# Patient Record
Sex: FEMALE | Race: WHITE | URBAN - METROPOLITAN AREA
[De-identification: names, ages, dates, MRNs, and addresses within clinical notes are randomized per-mention and may not be internally consistent; named-entity substitution may affect disease eponyms.]

---
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
## 2021-03-05 ENCOUNTER — APPOINTMENT (OUTPATIENT)
Dept: RADIOLOGY | Facility: MEDICAL CENTER | Age: 21
End: 2021-03-05
Attending: EMERGENCY MEDICINE
Payer: COMMERCIAL

## 2021-03-05 ENCOUNTER — HOSPITAL ENCOUNTER (EMERGENCY)
Facility: MEDICAL CENTER | Age: 21
End: 2021-03-05
Attending: EMERGENCY MEDICINE
Payer: COMMERCIAL

## 2021-03-05 VITALS
BODY MASS INDEX: 23.53 KG/M2 | HEIGHT: 67 IN | TEMPERATURE: 99.2 F | DIASTOLIC BLOOD PRESSURE: 80 MMHG | WEIGHT: 149.91 LBS | RESPIRATION RATE: 18 BRPM | OXYGEN SATURATION: 99 % | SYSTOLIC BLOOD PRESSURE: 126 MMHG | HEART RATE: 85 BPM

## 2021-03-05 DIAGNOSIS — S52.501A CLOSED FRACTURE OF DISTAL ENDS OF RIGHT RADIUS AND ULNA, INITIAL ENCOUNTER: ICD-10-CM
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
## 2021-03-05 DIAGNOSIS — S52.601A CLOSED FRACTURE OF DISTAL ENDS OF RIGHT RADIUS AND ULNA, INITIAL ENCOUNTER: ICD-10-CM

## 2021-03-05 PROCEDURE — 73090 X-RAY EXAM OF FOREARM: CPT | Mod: RT

## 2021-03-05 PROCEDURE — 302874 HCHG BANDAGE ACE 2 OR 3""

## 2021-03-05 PROCEDURE — 29125 APPL SHORT ARM SPLINT STATIC: CPT

## 2021-03-05 PROCEDURE — A9270 NON-COVERED ITEM OR SERVICE: HCPCS | Performed by: EMERGENCY MEDICINE

## 2021-03-05 PROCEDURE — 99284 EMERGENCY DEPT VISIT MOD MDM: CPT

## 2021-03-05 PROCEDURE — 700102 HCHG RX REV CODE 250 W/ 637 OVERRIDE(OP): Performed by: EMERGENCY MEDICINE

## 2021-03-05 PROCEDURE — 73200 CT UPPER EXTREMITY W/O DYE: CPT | Mod: RT

## 2021-03-05 PROCEDURE — 73130 X-RAY EXAM OF HAND: CPT | Mod: RT

## 2021-03-05 RX ORDER — HYDROCODONE BITARTRATE AND ACETAMINOPHEN 5; 325 MG/1; MG/1
1 TABLET ORAL EVERY 6 HOURS PRN
Qty: 10 TABLET | Refills: 0 | Status: CANCELLED | OUTPATIENT
Start: 2021-03-05 | End: 2021-03-08

## 2021-03-05 RX ORDER — HYDROCODONE BITARTRATE AND ACETAMINOPHEN 5; 325 MG/1; MG/1
1 TABLET ORAL EVERY 6 HOURS PRN
Qty: 10 TABLET | Refills: 0 | Status: SHIPPED | OUTPATIENT
Start: 2021-03-05 | End: 2021-03-05 | Stop reason: SDUPTHER

## 2021-03-05 RX ORDER — HYDROCODONE BITARTRATE AND ACETAMINOPHEN 5; 325 MG/1; MG/1
1 TABLET ORAL ONCE
Status: COMPLETED | OUTPATIENT
Start: 2021-03-05 | End: 2021-03-05

## 2021-03-05 RX ORDER — HYDROCODONE BITARTRATE AND ACETAMINOPHEN 5; 325 MG/1; MG/1
1 TABLET ORAL EVERY 6 HOURS PRN
Qty: 10 TABLET | Refills: 0 | Status: SHIPPED | OUTPATIENT
Start: 2021-03-05 | End: 2021-03-08

## 2021-03-05 RX ADMIN — HYDROCODONE BITARTRATE AND ACETAMINOPHEN 1 TABLET: 5; 325 TABLET ORAL at 19:10

## 2021-03-05 RX ADMIN — HYDROCODONE BITARTRATE AND ACETAMINOPHEN 1 TABLET: 5; 325 TABLET ORAL at 21:13

## 2021-03-05 ASSESSMENT — PAIN DESCRIPTION - DESCRIPTORS: DESCRIPTORS: ACHING

## 2021-03-06 NOTE — ED PROVIDER NOTES
"ED Provider Note    CHIEF COMPLAINT  Chief Complaint   Patient presents with   • Arm Injury   • Hand Injury   • Wrist Injury        HPI    Primary care provider: No primary care provider on file.   History obtained from: Patient, mother and sister  History limited by: None     Enedelia Rosenberg is a 20 y.o. female who presents to the ED with mother and sister complaining of right hand/wrist/distal forearm injury shortly prior to arrival while snowboarding.  Patient states that she fell on right outstretched hand and also hit her head but denies loss of consciousness.  She denies pain anywhere else except the right hand/wrist/forearm which was placed in a splint by .  She reports pain with movement.  She denies any sensory change.  She is right-hand dominant.  She denies possibility of pregnancy and is on birth control.  Mother reports patient without significant past medical problems.    REVIEW OF SYSTEMS  Please see HPI for pertinent positives/negatives.     PAST MEDICAL HISTORY  No past medical history on file.     SURGICAL HISTORY  History reviewed. No pertinent surgical history.     SOCIAL HISTORY  Social History     Tobacco Use   • Smoking status: Never Smoker   • Smokeless tobacco: Never Used   Substance and Sexual Activity   • Alcohol use: Yes     Comment: occasionally   • Drug use: Not on file   • Sexual activity: Not on file        FAMILY HISTORY  History reviewed. No pertinent family history.     CURRENT MEDICATIONS  Home Medications     Reviewed by Ella Robledo PharmD (Pharmacist) on 03/05/21 at 8159  Med List Status: Partial   Medication Last Dose Status        Patient Pancho Taking any Medications                        ALLERGIES  No Known Allergies     PHYSICAL EXAM  VITAL SIGNS: /80   Pulse 85   Temp 37.3 °C (99.2 °F) (Temporal)   Resp 18   Ht 1.702 m (5' 7\")   Wt 68 kg (149 lb 14.6 oz)   LMP  (Exact Date)   SpO2 99%   BMI 23.48 kg/m²  @DARLING[[id number]::@     Pulse ox " interpretation: 98% I interpret this pulse ox as normal     Constitutional: Well developed, well nourished, alert in mild discomfort, nontoxic appearance   HENT: No external signs of trauma, normocephalic   Eyes: No discharge, no icterus   Neck: No stridor   Cardiovascular: Strong distal pulses and good perfusion   Thorax & Lungs: No respiratory distress   Extremities: No cyanosis, swelling and deformity of right wrist with tenderness to palpation and limited range of motion as well as tenderness of the right hand diffusely, nontender to palpation from the mid forearm proximally, sensation intact to touch throughout, patient able to wiggle her fingers but not right thumb, intact distal pulses with brisk cap refill   Skin: Warm, dry, no pallor/cyanosis, no rash noted   Neuro: A/O times 3, no focal deficits noted   Psychiatric: Cooperative, slightly anxious      DIAGNOSTIC STUDIES / PROCEDURES    SPLINT PLACEMENT:  The patient was placed in a sugar tong splint and the splint was applied by ED tech.  Following splint application I rechecked the position and circulation and neuro function.  The splint was in good position with good neurovascular function.  The right forearm was well immobilized.      LABS  All labs reviewed by me.     No results found for this or any previous visit.     RADIOLOGY  The radiologist's interpretation of all radiological studies have been reviewed by me.     CT-WRIST W/O PLUS RECONS RIGHT   Final Result         1.  Impacted distal radial fracture with neutral volar tilt.   2.  Ulnar styloid fracture      DX-FOREARM RIGHT   Final Result         Acute impacted fracture of the distal radius.      DX-HAND 3+ RIGHT   Final Result      Distal radial and ulnar fractures.                   COURSE & MEDICAL DECISION MAKING  Nursing notes, VS, PMSFHx reviewed in chart.     Review of past medical records shows the patient without prior visits to this ED.      Differential diagnoses considered include  but are not limited to: Fx, dislocation, subluxation, contusion, strain, sprain, neurovascular injury       History and physical exam as above.  Initial x-rays with findings as above.  Findings discussed with Dr. Mahajan, orthopedic surgeon, who reviewed the images and recommended that patient can be placed in a splint with outpatient follow-up.  He requested a CT of the wrist in the ED prior to discharge for follow-up purposes.  CT was performed with above findings.  I discussed the findings and plan with the patient and mother and they are agreeable.  She was treated with Norco for pain and will be discharged with a prescription of Norco to use as needed.  She was placed in a sugar tong splint and given sling for comfort and support.  No evidence for compartment syndrome or significant neurovascular injury.  They were advised on ice and elevation and given return to ED precautions as well as need for outpatient follow-up with orthopedics.  They verbalized understanding and agreed with plan of care with no further questions or concerns.      In prescribing controlled substances to this patient, I certify that I have obtained and reviewed the medical history of Enedelia Rosenberg. I have also made a good jennifer effort to obtain applicable records from other providers who have treated the patient and records did not demonstrate any increased risk of substance abuse that would prevent me from prescribing controlled substances.     I have conducted a physical exam and documented it. I have reviewed patient's prescription history as maintained by the [place] Prescription Monitoring Program.     I have assessed the patient’s risk for abuse, dependency, and addiction using the validated Opioid Risk Tool available at https://www.mdcalc.com/oiztmu-xciz-yzcn-ort-narcotic-abuse.     Given the above, I believe the benefits of controlled substance therapy outweigh the risks. The reasons for prescribing controlled substances include  non-narcotic, oral analgesic alternatives have been inadequate for pain control. Accordingly, I have discussed the risk and benefits, treatment plan, and alternative therapies with the patient.       The patient is referred to a primary physician for blood pressure management, diabetic screening, and for all other preventative health concerns.       FINAL IMPRESSION  1. Closed fracture of distal ends of right radius and ulna, initial encounter Acute   2. Snowboard accident, initial encounter Acute          DISPOSITION  Patient will be discharged home in stable condition.       FOLLOW UP  Librado Mahajan M.D.  [address]  [address]  [facility] [id number] [phone]      Please call on Monday and they should schedule you an appointment for Tuesday    [facility] [place], Emergency Dept  [address]  [place] [id number] [phone]    If symptoms worsen         OUTPATIENT MEDICATIONS  Discharge Medication List as of 3/5/2021  9:33 PM             Electronically signed by: Nato Raymond D.O., 3/5/2021 6:42 PM      Portions of this record were made with voice recognition software.  Despite my review, spelling/grammar/context errors may still remain.  Interpretation of this chart should be taken in this context.

## 2021-03-06 NOTE — ED NOTES
Pt discharged home per provider in stable condition. Paperwork provided to pt via RN and discharge instructions went over with by RN - pt verbalized understanding of teaching with no questions or concerns and is A/Ox4, VSS. Paperwork in hand on discharge.    Paperwork in hand on discharge. No questions or concerns. Pt is ambulatory out with mother. Prescriptions sent to [facility] pharmacy and pt verbalized understanding of narcotics and will  tonight. Splint and sling in place.

## 2021-03-06 NOTE — ED TRIAGE NOTES
"Presents accompanied by family.  Earlier this evening, while snowboarding pt experienced a fall injuring her right arm.  She denies neck pain, and she was not wearing a helmet.  A C collar has been secured in place.   Chief Complaint   Patient presents with   • Arm Injury   • Hand Injury   • Wrist Injury     /83   Pulse (!) 114   Temp 37.6 °C (99.6 °F) (Temporal)   Resp 16   Ht 1.702 m (5' 7\")   LMP  (Exact Date)   SpO2 98%        "

## 2021-03-27 ENCOUNTER — PRE-ADMISSION TESTING (OUTPATIENT)
Dept: ADMISSIONS | Facility: MEDICAL CENTER | Age: 21
End: 2021-03-27
Attending: ORTHOPAEDIC SURGERY
Payer: COMMERCIAL

## 2021-03-27 DIAGNOSIS — Z01.812 PRE-OPERATIVE LABORATORY EXAMINATION: ICD-10-CM

## 2021-03-27 LAB
COVID ORDER STATUS COVID19: NORMAL
SARS-COV-2 RNA RESP QL NAA+PROBE: NOTDETECTED
SPECIMEN SOURCE: NORMAL

## 2021-03-27 PROCEDURE — U0003 INFECTIOUS AGENT DETECTION BY NUCLEIC ACID (DNA OR RNA); SEVERE ACUTE RESPIRATORY SYNDROME CORONAVIRUS 2 (SARS-COV-2) (CORONAVIRUS DISEASE [COVID-19]), AMPLIFIED PROBE TECHNIQUE, MAKING USE OF HIGH THROUGHPUT TECHNOLOGIES AS DESCRIBED BY CMS-2020-01-R: HCPCS

## 2021-03-27 PROCEDURE — C9803 HOPD COVID-19 SPEC COLLECT: HCPCS

## 2021-03-27 PROCEDURE — U0005 INFEC AGEN DETEC AMPLI PROBE: HCPCS

## 2021-03-29 ENCOUNTER — ANESTHESIA EVENT (OUTPATIENT)
Dept: SURGERY | Facility: MEDICAL CENTER | Age: 21
End: 2021-03-29
Payer: COMMERCIAL

## 2021-03-29 ENCOUNTER — APPOINTMENT (OUTPATIENT)
Dept: RADIOLOGY | Facility: MEDICAL CENTER | Age: 21
End: 2021-03-29
Attending: ORTHOPAEDIC SURGERY
Payer: COMMERCIAL

## 2021-03-29 ENCOUNTER — HOSPITAL ENCOUNTER (OUTPATIENT)
Facility: MEDICAL CENTER | Age: 21
End: 2021-03-29
Attending: ORTHOPAEDIC SURGERY | Admitting: ORTHOPAEDIC SURGERY
Payer: COMMERCIAL

## 2021-03-29 ENCOUNTER — ANESTHESIA (OUTPATIENT)
Dept: SURGERY | Facility: MEDICAL CENTER | Age: 21
End: 2021-03-29
Payer: COMMERCIAL

## 2021-03-29 VITALS
HEART RATE: 67 BPM | SYSTOLIC BLOOD PRESSURE: 136 MMHG | OXYGEN SATURATION: 97 % | HEIGHT: 67 IN | BODY MASS INDEX: 22.66 KG/M2 | TEMPERATURE: 97.8 F | DIASTOLIC BLOOD PRESSURE: 75 MMHG | RESPIRATION RATE: 16 BRPM | WEIGHT: 144.4 LBS

## 2021-03-29 DIAGNOSIS — G89.18 ACUTE POSTOPERATIVE PAIN OF EXTREMITY: ICD-10-CM

## 2021-03-29 LAB — HCG UR QL: NEGATIVE

## 2021-03-29 PROCEDURE — 160035 HCHG PACU - 1ST 60 MINS PHASE I: Performed by: ORTHOPAEDIC SURGERY

## 2021-03-29 PROCEDURE — 700105 HCHG RX REV CODE 258: Performed by: ORTHOPAEDIC SURGERY

## 2021-03-29 PROCEDURE — 160009 HCHG ANES TIME/MIN: Performed by: ORTHOPAEDIC SURGERY

## 2021-03-29 PROCEDURE — 700105 HCHG RX REV CODE 258: Performed by: ANESTHESIOLOGY

## 2021-03-29 PROCEDURE — 700111 HCHG RX REV CODE 636 W/ 250 OVERRIDE (IP): Performed by: ANESTHESIOLOGY

## 2021-03-29 PROCEDURE — 160025 RECOVERY II MINUTES (STATS): Performed by: ORTHOPAEDIC SURGERY

## 2021-03-29 PROCEDURE — 160048 HCHG OR STATISTICAL LEVEL 1-5: Performed by: ORTHOPAEDIC SURGERY

## 2021-03-29 PROCEDURE — 160036 HCHG PACU - EA ADDL 30 MINS PHASE I: Performed by: ORTHOPAEDIC SURGERY

## 2021-03-29 PROCEDURE — 160028 HCHG SURGERY MINUTES - 1ST 30 MINS LEVEL 3: Performed by: ORTHOPAEDIC SURGERY

## 2021-03-29 PROCEDURE — 81025 URINE PREGNANCY TEST: CPT

## 2021-03-29 PROCEDURE — 700101 HCHG RX REV CODE 250: Performed by: ANESTHESIOLOGY

## 2021-03-29 PROCEDURE — 700101 HCHG RX REV CODE 250: Performed by: STUDENT IN AN ORGANIZED HEALTH CARE EDUCATION/TRAINING PROGRAM

## 2021-03-29 PROCEDURE — A9270 NON-COVERED ITEM OR SERVICE: HCPCS | Performed by: ANESTHESIOLOGY

## 2021-03-29 PROCEDURE — C1713 ANCHOR/SCREW BN/BN,TIS/BN: HCPCS | Performed by: ORTHOPAEDIC SURGERY

## 2021-03-29 PROCEDURE — 73100 X-RAY EXAM OF WRIST: CPT | Mod: RT

## 2021-03-29 PROCEDURE — 160039 HCHG SURGERY MINUTES - EA ADDL 1 MIN LEVEL 3: Performed by: ORTHOPAEDIC SURGERY

## 2021-03-29 PROCEDURE — 160046 HCHG PACU - 1ST 60 MINS PHASE II: Performed by: ORTHOPAEDIC SURGERY

## 2021-03-29 PROCEDURE — 64415 NJX AA&/STRD BRCH PLXS IMG: CPT | Performed by: ORTHOPAEDIC SURGERY

## 2021-03-29 PROCEDURE — 700102 HCHG RX REV CODE 250 W/ 637 OVERRIDE(OP): Performed by: ANESTHESIOLOGY

## 2021-03-29 PROCEDURE — 700111 HCHG RX REV CODE 636 W/ 250 OVERRIDE (IP)

## 2021-03-29 PROCEDURE — 160002 HCHG RECOVERY MINUTES (STAT): Performed by: ORTHOPAEDIC SURGERY

## 2021-03-29 PROCEDURE — A9270 NON-COVERED ITEM OR SERVICE: HCPCS | Performed by: ORTHOPAEDIC SURGERY

## 2021-03-29 PROCEDURE — 700101 HCHG RX REV CODE 250: Performed by: ORTHOPAEDIC SURGERY

## 2021-03-29 PROCEDURE — 501838 HCHG SUTURE GENERAL: Performed by: ORTHOPAEDIC SURGERY

## 2021-03-29 DEVICE — SCREW 2.4MM LCP VA 18MM - (2VADRX5=10): Type: IMPLANTABLE DEVICE | Site: WRIST | Status: FUNCTIONAL

## 2021-03-29 DEVICE — WIRE K 1.25 X 150 292.12 (7TX10+2TX6+1TX20=102) CYC40 SM20 (10EA/PK): Type: IMPLANTABLE DEVICE | Site: WRIST | Status: FUNCTIONAL

## 2021-03-29 DEVICE — WIRE K 1.6 X 150 292.16 (10EA/PK) (11TX10+2TX6+1TX20=142)(CYC=30): Type: IMPLANTABLE DEVICE | Site: WRIST | Status: FUNCTIONAL

## 2021-03-29 DEVICE — PLATE 2.4 LCP VA VDR 6X3H RT - (2VADR=2) 2-CLMN: Type: IMPLANTABLE DEVICE | Site: WRIST | Status: FUNCTIONAL

## 2021-03-29 DEVICE — SCREW CRTX 2.7X14MM (3TX3+1TX6=15)(CYC=3): Type: IMPLANTABLE DEVICE | Site: WRIST | Status: FUNCTIONAL

## 2021-03-29 DEVICE — SCREW 2.4MM LCP VA 16MM - (2VADRX5=10): Type: IMPLANTABLE DEVICE | Site: WRIST | Status: FUNCTIONAL

## 2021-03-29 RX ORDER — ONDANSETRON 2 MG/ML
4 INJECTION INTRAMUSCULAR; INTRAVENOUS
Status: DISCONTINUED | OUTPATIENT
Start: 2021-03-29 | End: 2021-03-29 | Stop reason: HOSPADM

## 2021-03-29 RX ORDER — DEXAMETHASONE SODIUM PHOSPHATE 4 MG/ML
INJECTION, SOLUTION INTRA-ARTICULAR; INTRALESIONAL; INTRAMUSCULAR; INTRAVENOUS; SOFT TISSUE PRN
Status: DISCONTINUED | OUTPATIENT
Start: 2021-03-29 | End: 2021-03-29 | Stop reason: SURG

## 2021-03-29 RX ORDER — BUPIVACAINE HYDROCHLORIDE AND EPINEPHRINE 5; 5 MG/ML; UG/ML
INJECTION, SOLUTION EPIDURAL; INTRACAUDAL; PERINEURAL
Status: COMPLETED | OUTPATIENT
Start: 2021-03-29 | End: 2021-03-29

## 2021-03-29 RX ORDER — MAGNESIUM HYDROXIDE 1200 MG/15ML
LIQUID ORAL
Status: COMPLETED | OUTPATIENT
Start: 2021-03-29 | End: 2021-03-29

## 2021-03-29 RX ORDER — HYDROMORPHONE HYDROCHLORIDE 1 MG/ML
0.2 INJECTION, SOLUTION INTRAMUSCULAR; INTRAVENOUS; SUBCUTANEOUS
Status: DISCONTINUED | OUTPATIENT
Start: 2021-03-29 | End: 2021-03-29 | Stop reason: HOSPADM

## 2021-03-29 RX ORDER — LIDOCAINE HYDROCHLORIDE 10 MG/ML
INJECTION, SOLUTION EPIDURAL; INFILTRATION; INTRACAUDAL; PERINEURAL
Status: COMPLETED
Start: 2021-03-29 | End: 2021-03-29

## 2021-03-29 RX ORDER — OXYCODONE AND ACETAMINOPHEN 10; 325 MG/1; MG/1
.5-1 TABLET ORAL EVERY 6 HOURS PRN
Qty: 28 TABLET | Refills: 0 | Status: SHIPPED | OUTPATIENT
Start: 2021-03-29 | End: 2021-04-05

## 2021-03-29 RX ORDER — HALOPERIDOL 5 MG/ML
1 INJECTION INTRAMUSCULAR
Status: DISCONTINUED | OUTPATIENT
Start: 2021-03-29 | End: 2021-03-29 | Stop reason: HOSPADM

## 2021-03-29 RX ORDER — CEFAZOLIN SODIUM 1 G/3ML
INJECTION, POWDER, FOR SOLUTION INTRAMUSCULAR; INTRAVENOUS PRN
Status: DISCONTINUED | OUTPATIENT
Start: 2021-03-29 | End: 2021-03-29 | Stop reason: SURG

## 2021-03-29 RX ORDER — NORGESTIMATE AND ETHINYL ESTRADIOL 0.25-0.035
1 KIT ORAL EVERY EVENING
COMMUNITY
End: 2021-12-27

## 2021-03-29 RX ORDER — OXYCODONE HCL 5 MG/5 ML
10 SOLUTION, ORAL ORAL
Status: COMPLETED | OUTPATIENT
Start: 2021-03-29 | End: 2021-03-29

## 2021-03-29 RX ORDER — PHENYLEPHRINE HCL IN 0.9% NACL 0.5 MG/5ML
SYRINGE (ML) INTRAVENOUS PRN
Status: DISCONTINUED | OUTPATIENT
Start: 2021-03-29 | End: 2021-03-29 | Stop reason: SURG

## 2021-03-29 RX ORDER — KETOROLAC TROMETHAMINE 30 MG/ML
INJECTION, SOLUTION INTRAMUSCULAR; INTRAVENOUS PRN
Status: DISCONTINUED | OUTPATIENT
Start: 2021-03-29 | End: 2021-03-29 | Stop reason: SURG

## 2021-03-29 RX ORDER — MIDAZOLAM HYDROCHLORIDE 1 MG/ML
INJECTION INTRAMUSCULAR; INTRAVENOUS PRN
Status: DISCONTINUED | OUTPATIENT
Start: 2021-03-29 | End: 2021-03-29 | Stop reason: SURG

## 2021-03-29 RX ORDER — OXYCODONE HCL 5 MG/5 ML
5 SOLUTION, ORAL ORAL
Status: COMPLETED | OUTPATIENT
Start: 2021-03-29 | End: 2021-03-29

## 2021-03-29 RX ORDER — HYDROMORPHONE HYDROCHLORIDE 1 MG/ML
0.4 INJECTION, SOLUTION INTRAMUSCULAR; INTRAVENOUS; SUBCUTANEOUS
Status: DISCONTINUED | OUTPATIENT
Start: 2021-03-29 | End: 2021-03-29 | Stop reason: HOSPADM

## 2021-03-29 RX ORDER — LIDOCAINE HYDROCHLORIDE 20 MG/ML
INJECTION, SOLUTION EPIDURAL; INFILTRATION; INTRACAUDAL; PERINEURAL PRN
Status: DISCONTINUED | OUTPATIENT
Start: 2021-03-29 | End: 2021-03-29 | Stop reason: SURG

## 2021-03-29 RX ORDER — MEPERIDINE HYDROCHLORIDE 25 MG/ML
6.25 INJECTION INTRAMUSCULAR; INTRAVENOUS; SUBCUTANEOUS
Status: DISCONTINUED | OUTPATIENT
Start: 2021-03-29 | End: 2021-03-29 | Stop reason: HOSPADM

## 2021-03-29 RX ORDER — SODIUM CHLORIDE, SODIUM LACTATE, POTASSIUM CHLORIDE, CALCIUM CHLORIDE 600; 310; 30; 20 MG/100ML; MG/100ML; MG/100ML; MG/100ML
INJECTION, SOLUTION INTRAVENOUS CONTINUOUS
Status: ACTIVE | OUTPATIENT
Start: 2021-03-29 | End: 2021-03-29

## 2021-03-29 RX ORDER — SODIUM CHLORIDE, SODIUM LACTATE, POTASSIUM CHLORIDE, CALCIUM CHLORIDE 600; 310; 30; 20 MG/100ML; MG/100ML; MG/100ML; MG/100ML
INJECTION, SOLUTION INTRAVENOUS CONTINUOUS
Status: DISCONTINUED | OUTPATIENT
Start: 2021-03-29 | End: 2021-03-29 | Stop reason: HOSPADM

## 2021-03-29 RX ORDER — ONDANSETRON 2 MG/ML
INJECTION INTRAMUSCULAR; INTRAVENOUS PRN
Status: DISCONTINUED | OUTPATIENT
Start: 2021-03-29 | End: 2021-03-29 | Stop reason: SURG

## 2021-03-29 RX ORDER — DIPHENHYDRAMINE HYDROCHLORIDE 50 MG/ML
12.5 INJECTION INTRAMUSCULAR; INTRAVENOUS
Status: DISCONTINUED | OUTPATIENT
Start: 2021-03-29 | End: 2021-03-29 | Stop reason: HOSPADM

## 2021-03-29 RX ORDER — MINOCYCLINE HYDROCHLORIDE 50 MG/1
50 TABLET ORAL EVERY EVENING
COMMUNITY
End: 2022-07-18

## 2021-03-29 RX ORDER — HYDROMORPHONE HYDROCHLORIDE 1 MG/ML
0.1 INJECTION, SOLUTION INTRAMUSCULAR; INTRAVENOUS; SUBCUTANEOUS
Status: DISCONTINUED | OUTPATIENT
Start: 2021-03-29 | End: 2021-03-29 | Stop reason: HOSPADM

## 2021-03-29 RX ADMIN — Medication 100 MCG: at 15:45

## 2021-03-29 RX ADMIN — SODIUM CHLORIDE, POTASSIUM CHLORIDE, SODIUM LACTATE AND CALCIUM CHLORIDE: 600; 310; 30; 20 INJECTION, SOLUTION INTRAVENOUS at 13:26

## 2021-03-29 RX ADMIN — FENTANYL CITRATE 25 MCG: 50 INJECTION, SOLUTION INTRAMUSCULAR; INTRAVENOUS at 16:11

## 2021-03-29 RX ADMIN — OXYCODONE HYDROCHLORIDE 5 MG: 5 SOLUTION ORAL at 17:18

## 2021-03-29 RX ADMIN — PROPOFOL 50 MG: 10 INJECTION, EMULSION INTRAVENOUS at 16:18

## 2021-03-29 RX ADMIN — FENTANYL CITRATE 25 MCG: 50 INJECTION, SOLUTION INTRAMUSCULAR; INTRAVENOUS at 16:17

## 2021-03-29 RX ADMIN — BUPIVACAINE HYDROCHLORIDE AND EPINEPHRINE BITARTRATE 20 ML: 5; .005 INJECTION, SOLUTION EPIDURAL; INTRACAUDAL; PERINEURAL at 15:17

## 2021-03-29 RX ADMIN — LIDOCAINE HYDROCHLORIDE 0.5 ML: 10 INJECTION, SOLUTION EPIDURAL; INFILTRATION; INTRACAUDAL at 13:26

## 2021-03-29 RX ADMIN — LIDOCAINE HYDROCHLORIDE 60 MG: 20 INJECTION, SOLUTION EPIDURAL; INFILTRATION; INTRACAUDAL at 15:22

## 2021-03-29 RX ADMIN — CEFAZOLIN 2 G: 330 INJECTION, POWDER, FOR SOLUTION INTRAMUSCULAR; INTRAVENOUS at 15:27

## 2021-03-29 RX ADMIN — MIDAZOLAM HYDROCHLORIDE 2 MG: 1 INJECTION, SOLUTION INTRAMUSCULAR; INTRAVENOUS at 15:12

## 2021-03-29 RX ADMIN — FENTANYL CITRATE 50 MCG: 50 INJECTION, SOLUTION INTRAMUSCULAR; INTRAVENOUS at 15:37

## 2021-03-29 RX ADMIN — DEXAMETHASONE SODIUM PHOSPHATE 8 MG: 4 INJECTION, SOLUTION INTRA-ARTICULAR; INTRALESIONAL; INTRAMUSCULAR; INTRAVENOUS; SOFT TISSUE at 15:28

## 2021-03-29 RX ADMIN — SODIUM CHLORIDE, POTASSIUM CHLORIDE, SODIUM LACTATE AND CALCIUM CHLORIDE: 600; 310; 30; 20 INJECTION, SOLUTION INTRAVENOUS at 16:20

## 2021-03-29 RX ADMIN — Medication 0.5 ML: at 13:26

## 2021-03-29 RX ADMIN — KETOROLAC TROMETHAMINE 30 MG: 30 INJECTION, SOLUTION INTRAMUSCULAR at 16:07

## 2021-03-29 RX ADMIN — ONDANSETRON 4 MG: 2 INJECTION INTRAMUSCULAR; INTRAVENOUS at 15:58

## 2021-03-29 RX ADMIN — PROPOFOL 250 MG: 10 INJECTION, EMULSION INTRAVENOUS at 15:22

## 2021-03-29 RX ADMIN — POVIDONE IODINE 15 ML: 100 SOLUTION TOPICAL at 13:25

## 2021-03-29 RX ADMIN — SODIUM CHLORIDE, POTASSIUM CHLORIDE, SODIUM LACTATE AND CALCIUM CHLORIDE 900 ML: 600; 310; 30; 20 INJECTION, SOLUTION INTRAVENOUS at 16:35

## 2021-03-29 ASSESSMENT — PAIN DESCRIPTION - PAIN TYPE
TYPE: SURGICAL PAIN
TYPE: SURGICAL PAIN
TYPE: ACUTE PAIN
TYPE: SURGICAL PAIN

## 2021-03-29 NOTE — OR SURGEON
Immediate Post OP Note    PreOp Diagnosis: Right angulated subacute distal radius fracture    PostOp Diagnosis: same    Procedure(s):  ORIF, WRIST - DISTAL RADIUS - Wound Class: Clean    Surgeon(s):  Harris Koehler M.D.    Anesthesiologist/Type of Anesthesia:  Anesthesiologist: Cleopatra Ram M.D./General    Surgical Staff:  Circulator: Delfina Montelongo R.N.  Scrub Person: Sonya Lo  Pre-Post Nurse: Ld Christensen R.N.  Radiology Technologist: Santhosh Jesus    Specimens removed if any:  * No specimens in log *    Estimated Blood Loss: minimal    Findings: see dictation    Complications: none known    PLAN:  --discharge to home from PACU  --NWB RUE in splint  --fu 2 weeks postop for suture removal  --Rx for percocet sent to pharmacy        3/29/2021 4:34 PM Harris Koehler M.D.

## 2021-03-29 NOTE — ANESTHESIA PROCEDURE NOTES
Peripheral Block    Date/Time: 3/29/2021 3:17 PM  Performed by: Cleopatra Ram M.D.  Authorized by: Evert Harris M.D.     Patient Location:  OR  Start Time:  3/29/2021 3:17 PM  End Time:  3/29/2021 3:20 PM  Reason for Block: at surgeon's request and post-op pain management ONLY    patient identified, IV checked, site marked, risks and benefits discussed, surgical consent, monitors and equipment checked, pre-op evaluation and timeout performed    Patient Position:  Supine  Prep: ChloraPrep    Monitoring:  Heart rate, continuous pulse ox and cardiac monitor  Block Region:  Upper Extremity  Upper Extremity - Block Type:  BRACHIAL PLEXUS block, Supraclavicular approach    Laterality:  Right  Procedures: ultrasound guided  Image captured, interpreted and electronically stored.  Local Infiltration:  Lidocaine  Strength:  1 %  Dose:  3 ml  Block Type:  Single-shot  Needle Length:  50mm  Needle Gauge:  22 G  Needle Localization:  Ultrasound guidance  Injection Assessment:  Negative aspiration for heme, incremental injection, local visualized surrounding nerve on ultrasound and paresthesia-transient/resolved  Evidence of intravascular injection: No     Transient paresthesia, resolved with redirection.  Pt tolerated procedure well

## 2021-03-29 NOTE — ANESTHESIA POSTPROCEDURE EVALUATION
Patient: Enedelia Rosenberg    Procedure Summary     Date: 03/29/21 Room / Location: [place] [address] / SURGERY [facility]    Anesthesia Start: 1512 Anesthesia Stop:     Procedure: ORIF, WRIST - DISTAL RADIUS (Right ) Diagnosis: (CLOSED FRACTURE OF DISTAL END RADIUS)    Surgeons: Harris Koehler M.D. Responsible Provider: Cleopatra Ram M.D.    Anesthesia Type: general, peripheral nerve block ASA Status: 2          Final Anesthesia Type: general, peripheral nerve block  Last vitals  BP   Blood Pressure: 128/64    Temp   36 °C (96.8 °F)    Pulse   91   Resp   17    SpO2   100 %      Anesthesia Post Evaluation    Patient location during evaluation: PACU  Patient participation: complete - patient participated  Level of consciousness: awake and alert    Airway patency: patent  Anesthetic complications: no  Cardiovascular status: hemodynamically stable  Respiratory status: acceptable  Hydration status: euvolemic    PONV: none    patient able to participate, but full recovery from regional anesthesia has not occurred and is not expected within the stipulated timeframe for the completion of the evaluation      No complications documented.     Nurse Pain Score: 0 (NPRS)

## 2021-03-29 NOTE — ANESTHESIA PROCEDURE NOTES
Airway    Date/Time: 3/29/2021 3:23 PM  Performed by: Cleopatra Ram M.D.  Authorized by: Cleopatra Ram M.D.     Location:  OR  Urgency:  Elective  Difficult Airway: No    Indications for Airway Management:  Anesthesia      Spontaneous Ventilation: absent    Sedation Level:  Deep  Preoxygenated: Yes    Mask Difficulty Assessment:  1 - vent by mask  Final Airway Type:  Supraglottic airway  Final Supraglottic Airway:  Standard LMA    SGA Size:  4  Number of Attempts at Approach:  1

## 2021-03-29 NOTE — PROGRESS NOTES
Med rec completed per Pt at bedside and Pt's pharmacy [facility] on [facility] [address] ([phone]) to verify strength of Pt's minocycline as Pt was unsure.  Allergies reviewed with Pt. No known drug allergies.  Pt takes minocycline 50 mg 1 tablet every day.

## 2021-03-29 NOTE — OR NURSING
1624: received to PACU via gurney. Sleeping. Respirations spontaneous and unlabored. Dressing to right forearm and hand CDI. Elevated and iced. Brisk capillary refill.  1655: patient awaken. Denies pain or nausea. Can barely move right fingers.  1718: c/o right wrist pain. Pain scale 5/10. Medicated. See MAR.  1725: report called to Nataliya GLEASON.  1732: transported via [employer] to PACU 2. Stable. Pain scale 3/10 and tolerable. Denies nausea. Patient wearing face mask.

## 2021-03-29 NOTE — ANESTHESIA TIME REPORT
Anesthesia Start and Stop Event Times     Date Time Event    3/29/2021 1503 Ready for Procedure     1512 Anesthesia Start     1626 Anesthesia Stop        Responsible Staff  03/29/21    Name Role Begin End    Cleopatra Ram M.D. Anesth 1512 1626        Preop Diagnosis (Free Text):  Pre-op Diagnosis     RIGHT CLOSED FRACTURE OF DISTAL END RADIUS        Preop Diagnosis (Codes):    Post op Diagnosis  Closed fracture of distal end of left radius      Premium Reason  A. 3PM - 7AM    Comments:

## 2021-03-29 NOTE — ANESTHESIA PREPROCEDURE EVALUATION
Healthy 21 yo.  vapes     Relevant Problems   No relevant active problems       Physical Exam    Airway   Mallampati: II  TM distance: >3 FB  Neck ROM: full       Cardiovascular - normal exam  Rhythm: regular  Rate: normal  (-) murmur     Dental   Comments: Front upper left tooth with obvious horizontal crack being looked at by dentist.  Not loose.          Pulmonary - normal exam  Breath sounds clear to auscultation     Abdominal    Neurological - normal exam                 Anesthesia Plan    ASA 2       Plan - general and peripheral nerve block     Peripheral nerve block will be post-op pain control  Airway plan will be LMA          Induction: intravenous    Postoperative Plan: Postoperative administration of opioids is intended.    Pertinent diagnostic labs and testing reviewed    Informed Consent:    Anesthetic plan and risks discussed with patient and mother.

## 2021-03-30 NOTE — OP REPORT
DATE OF SERVICE:  03/29/2021     PREOPERATIVE DIAGNOSIS:  Right angulated subacute distal radius fracture.     POSTOPERATIVE DIAGNOSIS:  Right angulated subacute distal radius fracture.     PROCEDURE PERFORMED:  Open treatment with internal fixation of right distal   radius fracture.     SURGEON:  Harris Koehler MD     ANESTHESIOLOGIST:  Cleopatra Ram MD     ANESTHESIA:  General and regional.     ESTIMATED BLOOD LOSS:  Minimal.     TOURNIQUET TIME:  36 minutes at 250 mmHg to the right arm.     IMPLANTS:  Synthes volar distal radius locking plate.     INDICATION FOR PROCEDURE:  The patient is a 20-year-old female, a little about   three and a half weeks ago she sustained a distal radius fracture.  She was   initially treated nonoperatively by my colleague, Dr. Mahajan.  She   recently developed some evidence of apex volar angulation in her cast and Dr. Mahajan asked if I would be available for surgical treatment and I met the   patient last Friday.  Given that she is a young, healthy, and is right-hand   dominant and she had apex volar angulation with dorsal tilt of at least 10   degrees, I felt she would benefit from surgical reduction and fixation.  We   discussed pros and cons of surgery as well as the risks of surgery.  She   expressed understanding, signed informed consent and wished to proceed with   surgery as outlined above.     DESCRIPTION OF PROCEDURE:  The patient was met in the preoperative holding   area.  Her surgical site was signed.  Her consent was confirmed to be   accurate.  She was taken back to the operating room.  Dr. Ram performed   regional anesthetic at my request to help with postoperative pain control.    The right upper extremity was provisionally cleansed with alcohol.  A   tourniquet was applied to the right arm.  The right upper extremity was then   prepped and draped in the usual sterile fashion.  A formal timeout was   performed to confirm the patient's correct  name, correct surgical site,   correct procedure and correct laterality.  The limb was exsanguinated with   Esmarch and the tourniquet was inflated to 250 mmHg.  A volar incision was   made centered over the FCR tendon sheath with the scalpel down through skin.    Dissection was carried down through the FCR sheath volarly and dorsally.  I   mobilized the musculature exposing the pronator quadratus and released it in   an L-shaped fashion off of the radial border and volar lip, and exposed the   fracture site.  There was a minimal motion present at the fracture site, which   was healing in apex volar angulation.  I did require the use of an osteotome   to break through the early healing and mobilized the distal segment and   restored the volar tilt and then positioned a 3-hole narrow volar distal   radius locking plate with appropriate position, pinned it to the distal radial   metaphysis and fixed it with several locking screws distally and then fixed   it proximally with 3 bicortical nonlocking screws restoring the volar tilt.    Final fluoroscopic imaging confirmed overall acceptable alignment of the   fracture and acceptable position of the implants.  There was some mild dorsal   gapping at the fracture site.  Given the need for manipulating the fracture   with an osteotome and to restore the volar tilt, but overall I felt that this   did not require bone grafting and should heal uneventfully.  The wound was   thoroughly irrigated with normal saline.  I repaired the pronator quadratus   fascia to cover a portion of the plate with 0 Vicryl, subcutaneous layers with   2-0 Vicryl and the skin edges with running 3-0 nylon.  The wound was   thoroughly cleansed, dried, and sterile dressing and a well-padded volar   plaster splint was applied.  The tourniquet was deflated after 36 minutes.    She was awoken from anesthesia, transferred on the [facility] and taken to the   postanesthesia care unit in stable condition.      PLAN:  1.  The patient will be discharged home from the PACU when she recovers from   anesthesia.  2.  She should be nonweightbearing to the right lower extremity in her splint.  3.  She will follow up in two weeks postop as scheduled for routine wound   check and suture removal.  4.  We will send a prescription for Percocet as needed for moderate   postoperative pain to her pharmacy.        ______________________________  MD TERESO Chen/DELMAR/PETEY    DD:  03/29/2021 16:42  DT:  03/29/2021 18:52    Job#:  [id number]

## 2021-03-30 NOTE — DISCHARGE INSTRUCTIONS
ACTIVITY: Rest and take it easy for the first 24 hours.  A responsible adult is recommended to remain with you during that time.  It is normal to feel sleepy.  We encourage you to not do anything that requires balance, judgment or coordination.    MILD FLU-LIKE SYMPTOMS ARE NORMAL. YOU MAY EXPERIENCE GENERALIZED MUSCLE ACHES, THROAT IRRITATION, HEADACHE AND/OR SOME NAUSEA.    FOR 24 HOURS DO NOT:  Drive, operate machinery or run household appliances.  Drink beer or alcoholic beverages.   Make important decisions or sign legal documents.    SPECIAL INSTRUCTIONS:   --NWB RUE in splint  --follow up in 2 weeks postop for suture removal  --Prescription for percocet sent to pharmacy       DIET: To avoid nausea, slowly advance diet as tolerated, avoiding spicy or greasy foods for the first day.  Add more substantial food to your diet according to your physician's instructions. INCREASE FLUIDS AND FIBER TO AVOID CONSTIPATION.    SURGICAL DRESSING/BATHING: sutures to be removed in 2 weeks by MD    FOLLOW-UP APPOINTMENT:  A follow-up appointment should be arranged with your doctor in 2 weeks; call to schedule.    You should CALL YOUR PHYSICIAN if you develop:  Fever greater than 101 degrees F.  Pain not relieved by medication, or persistent nausea or vomiting.  Excessive bleeding (blood soaking through dressing) or unexpected drainage from the wound.  Extreme redness or swelling around the incision site, drainage of pus or foul smelling drainage.  Inability to urinate or empty your bladder within 8 hours.  Problems with breathing or chest pain.    You should call 911 if you develop problems with breathing or chest pain.  If you are unable to contact your doctor or surgical center, you should go to the nearest emergency room or urgent care center.  Physician's telephone #: [phone] Dr Koehler    If any questions arise, call your doctor.  If your doctor is not available, please feel free to call the Surgical Center at  [phone]. The Contact Center is open Monday through Friday 7AM to 5PM and may speak to a nurse at [phone], or toll free at [phone].     A registered nurse may call you a few days after your surgery to see how you are doing after your procedure.    MEDICATIONS: Resume taking daily medication.  Take prescribed pain medication with food.  If no medication is prescribed, you may take non-aspirin pain medication if needed.  PAIN MEDICATION CAN BE VERY CONSTIPATING.  Take a stool softener or laxative such as senokot, pericolace, or milk of magnesia if needed.    Prescription given for Percocet.  Last pain medication given at 5:20 pm.    If your physician has prescribed pain medication that includes Acetaminophen (Tylenol), do not take additional Acetaminophen (Tylenol) while taking the prescribed medication.    Depression / Suicide Risk    As you are discharged from this [facility] facility, it is important to learn how to keep safe from harming yourself.    Recognize the warning signs:  · Abrupt changes in personality, positive or negative- including increase in energy   · Giving away possessions  · Change in eating patterns- significant weight changes-  positive or negative  · Change in sleeping patterns- unable to sleep or sleeping all the time   · Unwillingness or inability to communicate  · Depression  · Unusual sadness, discouragement and loneliness  · Talk of wanting to die  · Neglect of personal appearance   · Rebelliousness- reckless behavior  · Withdrawal from people/activities they love  · Confusion- inability to concentrate     If you or a loved one observes any of these behaviors or has concerns about self-harm, here's what you can do:  · Talk about it- your feelings and reasons for harming yourself  · Remove any means that you might use to hurt yourself (examples: pills, rope, extension cords, firearm)  · Get professional help from the community (Mental Health, Substance Abuse,  psychological counseling)  · Do not be alone:Call your Safe Contact- someone whom you trust who will be there for you.  · Call your local CRISIS HOTLINE 013-2726 or 490-742-0491  · Call your local Children's Mobile Crisis Response Team [place] (522) 285-8003 or www.[employer]  · Call the toll free National Suicide Prevention Hotlines   · National Suicide Prevention Lifeline 858-223-HUYT (9002)  · National Hope Line Network 800-SUICIDE (632-8004)

## 2021-03-30 NOTE — OR NURSING
Arrived to Phase II after report from Evelyn RN    VSS, denies nausea, reports pain 0/10. Ambulated from gurney to chair with standby assist.    Surgical site CDI     Alarms on and set to appropriate parameters. Call light within reach, rounding in place.

## 2021-12-27 ENCOUNTER — OFFICE VISIT (OUTPATIENT)
Dept: URGENT CARE | Facility: CLINIC | Age: 21
End: 2021-12-27
Payer: COMMERCIAL

## 2021-12-27 VITALS
TEMPERATURE: 97.1 F | SYSTOLIC BLOOD PRESSURE: 120 MMHG | HEART RATE: 67 BPM | OXYGEN SATURATION: 100 % | DIASTOLIC BLOOD PRESSURE: 84 MMHG | RESPIRATION RATE: 16 BRPM
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
## 2021-12-27 PROBLEM — S52.509A CLOSED FRACTURE OF DISTAL END OF RADIUS: Status: ACTIVE | Noted: 2021-03-11

## 2021-12-27 PROCEDURE — 99213 OFFICE O/P EST LOW 20 MIN: CPT | Performed by: PHYSICIAN ASSISTANT

## 2021-12-27 RX ORDER — SULFAMETHOXAZOLE AND TRIMETHOPRIM 800; 160 MG/1; MG/1
1 TABLET ORAL 2 TIMES DAILY
Qty: 14 TABLET | Refills: 0 | Status: SHIPPED | OUTPATIENT
Start: 2021-12-27 | End: 2022-01-03

## 2021-12-27 RX ORDER — NORGESTIMATE AND ETHINYL ESTRADIOL 7DAYSX3 28
KIT ORAL
COMMUNITY
Start: 2021-12-27

## 2022-01-06 ASSESSMENT — ENCOUNTER SYMPTOMS
NECK PAIN: 0
HEADACHES: 0
CHILLS: 0
COUGH: 0
VOMITING: 0
NAUSEA: 0
SHORTNESS OF BREATH: 0
DIZZINESS: 0
FEVER: 0

## 2022-01-06 NOTE — PROGRESS NOTES
Subjective     Enedelia Rosenberg is a 21 y.o. female who presents with Bug Bite (x4days, bug bite on neck, redness, swelling, getting worse, painful to the touch)    HPI:  Enedelia Rosenberg is a 21 y.o. female who presents today for evaluation of a bug bite.  Patient recently [place] outside of the country and near the end of her trip she sustained which she thinks is a bug bite to the left side of her neck.  Over the past few days it has been getting bigger as well as more painful and more red.  She is concerned about infection.  She has not had any fever/chills, difficulty swallowing, shortness of breath, visual changes, or lightheadedness/dizziness.      Review of Systems   Constitutional: Negative for chills and fever.   Respiratory: Negative for cough and shortness of breath.    Cardiovascular: Negative for chest pain.   Gastrointestinal: Negative for nausea and vomiting.   Musculoskeletal: Negative for neck pain.   Skin:        Bug bite on neck   Neurological: Negative for dizziness and headaches.         PMH:  has no past medical history on file.  MEDS:   Current Outpatient Medications:   •  hydrocortisone 2.5 % Cream topical cream, Apply 1 Application topically 2 times a day., Disp: 3.5 g, Rfl: 0  •  Norgestim-Eth Estrad Triphasic 0.18/0.215/0.25 MG-35 MCG Tab, , Disp: , Rfl:   •  minocycline (DYNACIN) 50 MG tablet, Take 50 mg by mouth every evening. Indications: Common Acne (Patient not taking: Reported on 12/27/2021), Disp: , Rfl:   •  minocycline (MINOCIN) 100 MG Cap, Take 100 mg by mouth 2 times a day. (Patient not taking: Reported on 12/27/2021), Disp: , Rfl:   ALLERGIES: No Known Allergies  SURGHX:   Past Surgical History:   Procedure Laterality Date   • ORIF, WRIST Right 03/29/2021    right distal radius   • ORIF, WRIST Right 3/29/2021    Procedure: ORIF, WRIST - DISTAL RADIUS;  Surgeon: Harris Koehler M.D.;  Location: SURGERY [facility];  Service: Orthopedics     SOCHX:   reports that she has never smoked. She has never used smokeless tobacco. She reports current alcohol use. She reports that she does not use drugs.  FH: Family history was reviewed, no pertinent findings to report      Objective     /84 (BP Location: Left arm, Patient Position: Sitting, BP Cuff Size: Adult)   Pulse 67   Temp 36.2 °C (97.1 °F) (Temporal)   Resp 16   SpO2 100%      Physical Exam  Constitutional:       Appearance: She is well-developed.   HENT:      Head: Normocephalic and atraumatic.      Right Ear: External ear normal.      Left Ear: External ear normal.   Eyes:      Conjunctiva/sclera: Conjunctivae normal.      Pupils: Pupils are equal, round, and reactive to light.   Cardiovascular:      Rate and Rhythm: Normal rate and regular rhythm.      Heart sounds: Normal heart sounds. No murmur heard.      Pulmonary:      Effort: Pulmonary effort is normal.      Breath sounds: Normal breath sounds. No wheezing.   Musculoskeletal:      Cervical back: Normal range of motion.   Lymphadenopathy:      Cervical: No cervical adenopathy.   Skin:     General: Skin is warm and dry.      Capillary Refill: Capillary refill takes less than 2 seconds.      Comments: Left-sided neck exhibits an erythematous lesion with punctate center.  There are some mild surrounding induration.  No current discharge.  It is tender to palpation.  No fluctuance.  No streaking from the wound.   Neurological:      Mental Status: She is alert and oriented to person, place, and time.   Psychiatric:         Behavior: Behavior normal.         Judgment: Judgment normal.           Assessment & Plan     1. Bug bite with infection, initial encounter  - sulfamethoxazole-trimethoprim (BACTRIM DS) 800-160 MG tablet; Take 1 Tablet by mouth 2 times a day for 7 days.  Dispense: 14 Tablet; Refill: 0  - hydrocortisone 2.5 % Cream topical cream; Apply 1 Application topically 2 times a day.  Dispense: 3.5 g; Refill: 0      Symptoms are more  consistent with bacterial infection but cannot completely rule out the possibility of localized allergic response either.  We will start patient on Bactrim but discussed that if there is no significant improvement in symptoms after 48 hours she should try applying hydrocortisone cream to the area twice daily as well.  Strict return/ER precautions also discussed.            Differential Diagnosis, natural history, and supportive care discussed. Return to the Urgent Care or follow up with your PCP if symptoms fail to resolve, or for any new or worsening symptoms. Emergency room precautions discussed. Patient and/or family appears understanding of information.

## 2022-06-20 ENCOUNTER — TELEPHONE (OUTPATIENT)
Dept: SCHEDULING | Facility: IMAGING CENTER | Age: 22
End: 2022-06-20

## 2022-07-18 ENCOUNTER — OFFICE VISIT (OUTPATIENT)
Dept: MEDICAL GROUP | Facility: MEDICAL CENTER | Age: 22
End: 2022-07-18
Payer: COMMERCIAL

## 2022-07-18 VITALS
BODY MASS INDEX: 23.01 KG/M2 | HEIGHT: 67 IN | OXYGEN SATURATION: 100 % | WEIGHT: 146.6 LBS | DIASTOLIC BLOOD PRESSURE: 62 MMHG | HEART RATE: 82 BPM | TEMPERATURE: 98.2 F | SYSTOLIC BLOOD PRESSURE: 100 MMHG

## 2022-07-18 DIAGNOSIS — B35.4 TINEA CORPORIS: ICD-10-CM

## 2022-07-18 DIAGNOSIS — Z23 NEED FOR VACCINATION: ICD-10-CM

## 2022-07-18 PROBLEM — S52.509A CLOSED FRACTURE OF DISTAL END OF RADIUS: Status: RESOLVED | Noted: 2021-03-11 | Resolved: 2022-07-18

## 2022-07-18 PROBLEM — R21 RASH: Status: ACTIVE | Noted: 2022-07-18

## 2022-07-18 PROCEDURE — 90651 9VHPV VACCINE 2/3 DOSE IM: CPT | Performed by: NURSE PRACTITIONER

## 2022-07-18 PROCEDURE — 90621 MENB-FHBP VACC 2/3 DOSE IM: CPT | Performed by: NURSE PRACTITIONER

## 2022-07-18 PROCEDURE — 99214 OFFICE O/P EST MOD 30 MIN: CPT | Mod: 25 | Performed by: NURSE PRACTITIONER

## 2022-07-18 PROCEDURE — 90471 IMMUNIZATION ADMIN: CPT | Performed by: NURSE PRACTITIONER

## 2022-07-18 PROCEDURE — 90472 IMMUNIZATION ADMIN EACH ADD: CPT | Performed by: NURSE PRACTITIONER

## 2022-07-18 RX ORDER — KETOCONAZOLE 20 MG/ML
SHAMPOO TOPICAL
Qty: 120 ML | Refills: 1 | Status: SHIPPED | OUTPATIENT
Start: 2022-07-18

## 2022-07-18 NOTE — PROGRESS NOTES
Enedelia Rosenberg is a 21 y.o. female here to establish care and discuss the following:     HPI:   Rash  Ongoing for 3 months, described as starting on face a dry spot, spread to neck, and now spread to arms. Not on trunk, legs, back.     Itchy. Starts out as a red, itchy patch, then turns white and starts peeling. Very dry.     No drainage, scabbing, crusting. No joint pain, fevers. Does feel more tired than normal.     She has tried neosporin and Aquaphor which which helped with dryness.     No new exposure, pets, no recent travel.     Current medicines (including changes today)  Current Outpatient Medications   Medication Sig Dispense Refill   • ketoconazole (NIZORAL) 2 % shampoo Apply to affected area of damp skin, lather, leave on 5 minutes, and rinse. May repeat 2 more times. 120 mL 1   • Norgestim-Eth Estrad Triphasic 0.18/0.215/0.25 MG-35 MCG Tab        No current facility-administered medications for this visit.     She  has no past medical history on file.  She  has a past surgical history that includes orif, wrist (Right, 03/29/2021) and orif, wrist (Right, 3/29/2021).  Social History     Tobacco Use   • Smoking status: Never Smoker   • Smokeless tobacco: Never Used   Vaping Use   • Vaping Use: Every day   • Start date: 1/1/2019   • Substances: Nicotine   • Devices: Pre-filled pod   Substance Use Topics   • Alcohol use: Yes     Alcohol/week: 2.4 oz     Types: 4 Standard drinks or equivalent per week   • Drug use: Never     Social History     Social History Narrative    ** Merged History Encounter **          Family History   Problem Relation Age of Onset   • No Known Problems Mother    • No Known Problems Father      Family Status   Relation Name Status   • Mo  Alive   • Fa  Alive         ROS  No chest pain, no abdominal pain, no rash.  Positive ROS as per HPI.  All other systems reviewed and are negative      Objective:     /62 (BP Location: Right arm, Patient Position: Sitting, BP Cuff  "Size: Adult)   Pulse 82   Temp 36.8 °C (98.2 °F) (Temporal)   Ht 1.702 m (5' 7\")   Wt 66.5 kg (146 lb 9.6 oz)   SpO2 100%  Body mass index is 22.96 kg/m².  Physical Exam:    Constitutional: Alert, no distress.  Skin: Warm, dry, good turgor, no rashes in visible areas.  Scattered, well-circumscribed, dry flaky patches on bilateral arms, neck, face with underlying erythema  Eye: Equal, round and reactive, conjunctiva clear, lids normal.  ENMT: Mask in place  Respiratory: Unlabored respiratory effort  Psych: Alert and oriented x3, normal affect and mood.      Assessment and Plan:   The following treatment plan was discussed    1. Tinea corporis  Unstable  Trial ketoconazole shampoo as body wash for up to 3 uses  If not effective, will use Lotrisone twice daily  - ketoconazole (NIZORAL) 2 % shampoo; Apply to affected area of damp skin, lather, leave on 5 minutes, and rinse. May repeat 2 more times.  Dispense: 120 mL; Refill: 1    2. Need for vaccination  - 9VHPV Vaccine 2-3 Dose IM  - Meningococcal (IM) Group B      Records requested.  Followup: Return in about 2 months (around 9/18/2022) for Pap Smear.    I have placed the below orders and discussed them with an approved delegating provider. The MA is performing the below orders under the direction of Dr. Nelson    Please note that this dictation was created using voice recognition software. I have made every reasonable attempt to correct obvious errors, but I expect that there are errors of grammar and possibly content that I did not discover before finalizing the note.             "

## 2022-07-18 NOTE — ASSESSMENT & PLAN NOTE
Ongoing for 3 months, described as starting on face a dry spot, spread to neck, and now spread to arms. Not on trunk, legs, back.     Itchy. Starts out as a red, itchy patch, then turns white and starts peeling. Very dry.     No drainage, scabbing, crusting. No joint pain, fevers. Does feel more tired than normal.     She has tried neosporin and Aquaphor which which helped with dryness.     No new exposure, pets, no recent travel.

## 2022-09-16 ENCOUNTER — APPOINTMENT (OUTPATIENT)
Dept: MEDICAL GROUP | Facility: MEDICAL CENTER | Age: 22
End: 2022-09-16
Payer: COMMERCIAL

## (undated) DEVICE — BANDAGE ELASTIC 4 HONEYCOMB - 4"X5YD LF (20/CA)"

## (undated) DEVICE — GLOVE SZ 6.5 BIOGEL PI MICRO - PF LF (50PR/BX)

## (undated) DEVICE — PACK UPPER EXTREMITY (2EA/CA)

## (undated) DEVICE — GLOVE BIOGEL PI INDICATOR SZ 7.0 SURGICAL PF LF - (50/BX 4BX/CA)

## (undated) DEVICE — DRILL BIT 2.0 TWIST 310.19 - (17TX2+5TX1+MDFTX2=41) CYC=3

## (undated) DEVICE — SLEEVE, VASO, THIGH, MED

## (undated) DEVICE — TUBING CLEARLINK DUO-VENT - C-FLO (48EA/CA)

## (undated) DEVICE — PROTECTOR ULNA NERVE - (36PR/CA)

## (undated) DEVICE — DRAPE STRLE REG TOWEL 18X24 - (10/BX 4BX/CA)"

## (undated) DEVICE — DRILL BIT 1.8 100MM DEPTH MRK - (LDRX2+2MFLX2=6)

## (undated) DEVICE — GLOVE BIOGEL SZ 6 PF LATEX - (50EA/BX 4BX/CA)

## (undated) DEVICE — GOWN WARMING STANDARD FLEX - (30/CA)

## (undated) DEVICE — SENSOR SPO2 NEO LNCS ADHESIVE (20/BX) SEE USER NOTES

## (undated) DEVICE — PADDING CAST 4 IN STERILE - 4 X 4 YDS (24/CA)

## (undated) DEVICE — MASK, LARYNGEAL AIRWAY #4

## (undated) DEVICE — BOVIE BLADE COATED - (50/PK)

## (undated) DEVICE — DRAPE 36X28IN RAD CARM BND BG - (25/CA) O

## (undated) DEVICE — ELECTRODE 850 FOAM ADHESIVE - HYDROGEL RADIOTRNSPRNT (50/PK)

## (undated) DEVICE — SUTURE 3-0 ETHILON FS-1 - (36/BX) 30 INCH

## (undated) DEVICE — ELECTRODE DUAL RETURN W/ CORD - (50/PK)

## (undated) DEVICE — MASK ANESTHESIA ADULT  - (100/CA)

## (undated) DEVICE — SODIUM CHL IRRIGATION 0.9% 1000ML (12EA/CA)

## (undated) DEVICE — DRAPE LARGE 3 QUARTER - (20/CA)

## (undated) DEVICE — TOURNIQUET CUFF 18 X 3 ONE PORT DISP - STERILE (10/BX)

## (undated) DEVICE — GLOVE BIOGEL PI ORTHO SZ 7.5 PF LF (40PR/BX)

## (undated) DEVICE — CANISTER SUCTION 3000ML MECHANICAL FILTER AUTO SHUTOFF MEDI-VAC NONSTERILE LF DISP  (40EA/CA)

## (undated) DEVICE — SUTURE 2-0 VICRYL PLUS CT-1 36 (36PK/BX)"

## (undated) DEVICE — HEAD HOLDER JUNIOR/ADULT

## (undated) DEVICE — SET LEADWIRE 5 LEAD BEDSIDE DISPOSABLE ECG (1SET OF 5/EA)

## (undated) DEVICE — KIT ANESTHESIA W/CIRCUIT & 3/LT BAG W/FILTER (20EA/CA)

## (undated) DEVICE — SUCTION INSTRUMENT YANKAUER BULBOUS TIP W/O VENT (50EA/CA)

## (undated) DEVICE — PAD LAP STERILE 18 X 18 - (5/PK 40PK/CA)

## (undated) DEVICE — CHLORAPREP 26 ML APPLICATOR - ORANGE TINT(25/CA)

## (undated) DEVICE — NEPTUNE 4 PORT MANIFOLD - (20/PK)

## (undated) DEVICE — SUTURE GENERAL

## (undated) DEVICE — SET EXTENSION WITH 2 PORTS (48EA/CA) ***PART #2C8610 IS A SUBSTITUTE*****

## (undated) DEVICE — TOWELS CLOTH SURGICAL - (4/PK 20PK/CA)

## (undated) DEVICE — LACTATED RINGERS INJ 1000 ML - (14EA/CA 60CA/PF)